# Patient Record
Sex: MALE | Race: WHITE | NOT HISPANIC OR LATINO | Employment: FULL TIME | URBAN - METROPOLITAN AREA
[De-identification: names, ages, dates, MRNs, and addresses within clinical notes are randomized per-mention and may not be internally consistent; named-entity substitution may affect disease eponyms.]

---

## 2020-02-26 ENCOUNTER — HOSPITAL ENCOUNTER (OUTPATIENT)
Dept: RADIOLOGY | Facility: MEDICAL CENTER | Age: 30
End: 2020-02-26
Attending: PHYSICIAN ASSISTANT

## 2020-02-26 ENCOUNTER — OFFICE VISIT (OUTPATIENT)
Dept: URGENT CARE | Facility: PHYSICIAN GROUP | Age: 30
End: 2020-02-26

## 2020-02-26 VITALS
HEART RATE: 116 BPM | BODY MASS INDEX: 17.03 KG/M2 | WEIGHT: 137 LBS | DIASTOLIC BLOOD PRESSURE: 78 MMHG | TEMPERATURE: 101.3 F | OXYGEN SATURATION: 98 % | SYSTOLIC BLOOD PRESSURE: 124 MMHG | RESPIRATION RATE: 18 BRPM | HEIGHT: 75 IN

## 2020-02-26 DIAGNOSIS — J18.9 PNEUMONIA OF LEFT LOWER LOBE DUE TO INFECTIOUS ORGANISM: ICD-10-CM

## 2020-02-26 DIAGNOSIS — R05.9 COUGH: ICD-10-CM

## 2020-02-26 LAB
FLUAV+FLUBV AG SPEC QL IA: NEGATIVE
INT CON NEG: NEGATIVE
INT CON NEG: NORMAL
INT CON POS: NORMAL
INT CON POS: POSITIVE
S PYO AG THROAT QL: NEGATIVE

## 2020-02-26 PROCEDURE — 87880 STREP A ASSAY W/OPTIC: CPT | Performed by: PHYSICIAN ASSISTANT

## 2020-02-26 PROCEDURE — 87804 INFLUENZA ASSAY W/OPTIC: CPT | Performed by: PHYSICIAN ASSISTANT

## 2020-02-26 PROCEDURE — 71046 X-RAY EXAM CHEST 2 VIEWS: CPT

## 2020-02-26 PROCEDURE — 99204 OFFICE O/P NEW MOD 45 MIN: CPT | Performed by: PHYSICIAN ASSISTANT

## 2020-02-26 RX ORDER — AMOXICILLIN AND CLAVULANATE POTASSIUM 875; 125 MG/1; MG/1
1 TABLET, FILM COATED ORAL 2 TIMES DAILY
Qty: 14 TAB | Refills: 0 | Status: SHIPPED | OUTPATIENT
Start: 2020-02-26 | End: 2020-03-04

## 2020-02-26 RX ORDER — DOXYCYCLINE HYCLATE 100 MG
100 TABLET ORAL 2 TIMES DAILY
Qty: 14 TAB | Refills: 0 | Status: SHIPPED | OUTPATIENT
Start: 2020-02-26 | End: 2020-03-04

## 2020-02-26 RX ORDER — IBUPROFEN 200 MG
200 TABLET ORAL EVERY 6 HOURS PRN
COMMUNITY

## 2020-02-26 RX ORDER — IBUPROFEN 200 MG
600 TABLET ORAL ONCE
Status: COMPLETED | OUTPATIENT
Start: 2020-02-26 | End: 2020-02-26

## 2020-02-26 RX ADMIN — Medication 600 MG: at 17:11

## 2020-02-26 NOTE — LETTER
February 26, 2020         Patient: Cabrera Branch   YOB: 1990   Date of Visit: 2/26/2020           To Whom it May Concern:    Cabrera Branch was seen in my clinic on 2/26/2020. Please excuse his absence from 2/27/20-2/29/20.    If you have any questions or concerns, please don't hesitate to call.        Sincerely,           Missy Dalal P.A.-C.  Electronically Signed

## 2020-02-28 ENCOUNTER — HOSPITAL ENCOUNTER (EMERGENCY)
Facility: MEDICAL CENTER | Age: 30
End: 2020-02-28
Attending: EMERGENCY MEDICINE

## 2020-02-28 ENCOUNTER — APPOINTMENT (OUTPATIENT)
Dept: RADIOLOGY | Facility: MEDICAL CENTER | Age: 30
End: 2020-02-28
Attending: EMERGENCY MEDICINE

## 2020-02-28 VITALS
HEIGHT: 75 IN | DIASTOLIC BLOOD PRESSURE: 80 MMHG | SYSTOLIC BLOOD PRESSURE: 115 MMHG | BODY MASS INDEX: 17.16 KG/M2 | OXYGEN SATURATION: 97 % | RESPIRATION RATE: 23 BRPM | TEMPERATURE: 99.9 F | WEIGHT: 138.01 LBS | HEART RATE: 84 BPM

## 2020-02-28 DIAGNOSIS — J18.9 PNEUMONIA OF RIGHT LUNG DUE TO INFECTIOUS ORGANISM, UNSPECIFIED PART OF LUNG: ICD-10-CM

## 2020-02-28 DIAGNOSIS — J18.9 PNEUMONIA OF BOTH LOWER LOBES DUE TO INFECTIOUS ORGANISM: ICD-10-CM

## 2020-02-28 LAB
ALBUMIN SERPL BCP-MCNC: 4.2 G/DL (ref 3.2–4.9)
ALBUMIN/GLOB SERPL: 1.1 G/DL
ALP SERPL-CCNC: 95 U/L (ref 30–99)
ALT SERPL-CCNC: 76 U/L (ref 2–50)
ANION GAP SERPL CALC-SCNC: 10 MMOL/L (ref 0–11.9)
AST SERPL-CCNC: 97 U/L (ref 12–45)
BASOPHILS # BLD AUTO: 0.5 % (ref 0–1.8)
BASOPHILS # BLD: 0.02 K/UL (ref 0–0.12)
BILIRUB SERPL-MCNC: 0.8 MG/DL (ref 0.1–1.5)
BUN SERPL-MCNC: 18 MG/DL (ref 8–22)
CALCIUM SERPL-MCNC: 9.3 MG/DL (ref 8.5–10.5)
CHLORIDE SERPL-SCNC: 100 MMOL/L (ref 96–112)
CO2 SERPL-SCNC: 26 MMOL/L (ref 20–33)
CREAT SERPL-MCNC: 0.79 MG/DL (ref 0.5–1.4)
EOSINOPHIL # BLD AUTO: 0.04 K/UL (ref 0–0.51)
EOSINOPHIL NFR BLD: 1 % (ref 0–6.9)
ERYTHROCYTE [DISTWIDTH] IN BLOOD BY AUTOMATED COUNT: 42.2 FL (ref 35.9–50)
FLUAV RNA SPEC QL NAA+PROBE: NEGATIVE
FLUBV RNA SPEC QL NAA+PROBE: NEGATIVE
GLOBULIN SER CALC-MCNC: 3.7 G/DL (ref 1.9–3.5)
GLUCOSE SERPL-MCNC: 100 MG/DL (ref 65–99)
HCT VFR BLD AUTO: 45.6 % (ref 42–52)
HGB BLD-MCNC: 15.9 G/DL (ref 14–18)
IMM GRANULOCYTES # BLD AUTO: 0.01 K/UL (ref 0–0.11)
IMM GRANULOCYTES NFR BLD AUTO: 0.3 % (ref 0–0.9)
LACTATE BLD-SCNC: 1.4 MMOL/L (ref 0.5–2)
LYMPHOCYTES # BLD AUTO: 1.18 K/UL (ref 1–4.8)
LYMPHOCYTES NFR BLD: 29.8 % (ref 22–41)
MCH RBC QN AUTO: 31.7 PG (ref 27–33)
MCHC RBC AUTO-ENTMCNC: 34.9 G/DL (ref 33.7–35.3)
MCV RBC AUTO: 91 FL (ref 81.4–97.8)
MONOCYTES # BLD AUTO: 0.35 K/UL (ref 0–0.85)
MONOCYTES NFR BLD AUTO: 8.8 % (ref 0–13.4)
NEUTROPHILS # BLD AUTO: 2.36 K/UL (ref 1.82–7.42)
NEUTROPHILS NFR BLD: 59.6 % (ref 44–72)
NRBC # BLD AUTO: 0 K/UL
NRBC BLD-RTO: 0 /100 WBC
PLATELET # BLD AUTO: 199 K/UL (ref 164–446)
PMV BLD AUTO: 10.6 FL (ref 9–12.9)
POTASSIUM SERPL-SCNC: 3.8 MMOL/L (ref 3.6–5.5)
PROT SERPL-MCNC: 7.9 G/DL (ref 6–8.2)
RBC # BLD AUTO: 5.01 M/UL (ref 4.7–6.1)
SODIUM SERPL-SCNC: 136 MMOL/L (ref 135–145)
WBC # BLD AUTO: 4 K/UL (ref 4.8–10.8)

## 2020-02-28 PROCEDURE — 96365 THER/PROPH/DIAG IV INF INIT: CPT

## 2020-02-28 PROCEDURE — 700117 HCHG RX CONTRAST REV CODE 255: Performed by: EMERGENCY MEDICINE

## 2020-02-28 PROCEDURE — 71046 X-RAY EXAM CHEST 2 VIEWS: CPT

## 2020-02-28 PROCEDURE — 94640 AIRWAY INHALATION TREATMENT: CPT

## 2020-02-28 PROCEDURE — 36415 COLL VENOUS BLD VENIPUNCTURE: CPT

## 2020-02-28 PROCEDURE — 80053 COMPREHEN METABOLIC PANEL: CPT

## 2020-02-28 PROCEDURE — 700111 HCHG RX REV CODE 636 W/ 250 OVERRIDE (IP): Performed by: EMERGENCY MEDICINE

## 2020-02-28 PROCEDURE — 96375 TX/PRO/DX INJ NEW DRUG ADDON: CPT

## 2020-02-28 PROCEDURE — 700101 HCHG RX REV CODE 250: Performed by: EMERGENCY MEDICINE

## 2020-02-28 PROCEDURE — 71275 CT ANGIOGRAPHY CHEST: CPT

## 2020-02-28 PROCEDURE — 99285 EMERGENCY DEPT VISIT HI MDM: CPT

## 2020-02-28 PROCEDURE — 87502 INFLUENZA DNA AMP PROBE: CPT

## 2020-02-28 PROCEDURE — 83605 ASSAY OF LACTIC ACID: CPT

## 2020-02-28 PROCEDURE — 85025 COMPLETE CBC W/AUTO DIFF WBC: CPT

## 2020-02-28 RX ORDER — ALBUTEROL SULFATE 90 UG/1
2 AEROSOL, METERED RESPIRATORY (INHALATION) EVERY 6 HOURS PRN
Qty: 8.5 G | Refills: 0 | Status: SHIPPED | OUTPATIENT
Start: 2020-02-28

## 2020-02-28 RX ORDER — METHYLPREDNISOLONE SODIUM SUCCINATE 125 MG/2ML
125 INJECTION, POWDER, LYOPHILIZED, FOR SOLUTION INTRAMUSCULAR; INTRAVENOUS ONCE
Status: COMPLETED | OUTPATIENT
Start: 2020-02-28 | End: 2020-02-28

## 2020-02-28 RX ORDER — AZITHROMYCIN 250 MG/1
TABLET, FILM COATED ORAL
Qty: 6 TAB | Refills: 0 | Status: SHIPPED | OUTPATIENT
Start: 2020-02-28

## 2020-02-28 RX ORDER — AZITHROMYCIN 500 MG/1
500 INJECTION, POWDER, LYOPHILIZED, FOR SOLUTION INTRAVENOUS ONCE
Status: COMPLETED | OUTPATIENT
Start: 2020-02-28 | End: 2020-02-28

## 2020-02-28 RX ORDER — IPRATROPIUM BROMIDE AND ALBUTEROL SULFATE 2.5; .5 MG/3ML; MG/3ML
3 SOLUTION RESPIRATORY (INHALATION)
Status: COMPLETED | OUTPATIENT
Start: 2020-02-28 | End: 2020-02-28

## 2020-02-28 RX ADMIN — IOHEXOL 100 ML: 350 INJECTION, SOLUTION INTRAVENOUS at 12:25

## 2020-02-28 RX ADMIN — AZITHROMYCIN MONOHYDRATE 500 MG: 500 INJECTION, POWDER, LYOPHILIZED, FOR SOLUTION INTRAVENOUS at 13:38

## 2020-02-28 RX ADMIN — METHYLPREDNISOLONE SODIUM SUCCINATE 125 MG: 125 INJECTION, POWDER, FOR SOLUTION INTRAMUSCULAR; INTRAVENOUS at 10:30

## 2020-02-28 RX ADMIN — IPRATROPIUM BROMIDE AND ALBUTEROL SULFATE 3 ML: .5; 3 SOLUTION RESPIRATORY (INHALATION) at 10:14

## 2020-02-28 ASSESSMENT — ENCOUNTER SYMPTOMS
NAUSEA: 0
COUGH: 1
WHEEZING: 0
EYE REDNESS: 0
SORE THROAT: 0
EYE DISCHARGE: 0
MYALGIAS: 1
HEMOPTYSIS: 0
FEVER: 1
DIARRHEA: 0
DIZZINESS: 0
SHORTNESS OF BREATH: 0
SPUTUM PRODUCTION: 0
CHILLS: 1
ABDOMINAL PAIN: 0
VOMITING: 0

## 2020-02-28 ASSESSMENT — LIFESTYLE VARIABLES
DO YOU DRINK ALCOHOL: YES
HAVE PEOPLE ANNOYED YOU BY CRITICIZING YOUR DRINKING: NO
TOTAL SCORE: 0
HAVE YOU EVER FELT YOU SHOULD CUT DOWN ON YOUR DRINKING: NO
EVER FELT BAD OR GUILTY ABOUT YOUR DRINKING: NO
TOTAL SCORE: 0
TOTAL SCORE: 0
CONSUMPTION TOTAL: INCOMPLETE
EVER HAD A DRINK FIRST THING IN THE MORNING TO STEADY YOUR NERVES TO GET RID OF A HANGOVER: NO

## 2020-02-28 NOTE — ED PROVIDER NOTES
"ED Provider Note    Scribed for Otilio Chicas D.O. by Ivan Merchant. 2/28/2020  9:31 AM    Primary care provider: Pcp Pt States None  Means of arrival: walk in  History obtained from: patient  History limited by: none    CHIEF COMPLAINT  Chief Complaint   Patient presents with   • Cough     seen at [facility] a few days ago and xray completed - \"found something in my lung\"; cough since last wednesday, productive yellow sputum, pt reports fevers 105 at home.       HPI  Cabrera Branch is a 30 y.o. male who presents to the Emergency Department complaining of persistent cough for the last 9 days. Patient reports associated intermittent fever, max 105 °F, body aches, yellow sputum production. He states that his symptoms came on 9 days ago and have been gradually worsening. Patient was previously evaluated with radiology at urgent care and found to have abnormal lung findings, so he presents to the ED for evaluation and rule out of pneumonia. He reports a history of distant history of bronchitis. Patient denies shortness of breath, cigarette use, marijuana use.     REVIEW OF SYSTEMS  Pertinent positives include cough, fever, body aches, sputum production. Pertinent negatives include no shortness of breath.  All other systems reviewed and negative.    PAST MEDICAL HISTORY  History reviewed. No pertinent past medical history.    SURGICAL HISTORY  History reviewed. No pertinent surgical history.     SOCIAL HISTORY  Social History     Tobacco Use   • Smoking status: Never Smoker   • Smokeless tobacco: Never Used   Substance Use Topics   • Alcohol use: Not Currently   • Drug use: Not Currently      Social History     Substance and Sexual Activity   Drug Use Not Currently       FAMILY HISTORY  History reviewed. No pertinent family history.    CURRENT MEDICATIONS  Home Medications     Reviewed by Jasmin Alvarez R.N. (Registered Nurse) on 02/28/20 at 0859  Med List Status: Partial   Medication Last Dose Status " "  amoxicillin-clavulanate (AUGMENTIN) 875-125 MG Tab 2/27/2020 Active   doxycycline (VIBRAMYCIN) 100 MG Tab 2/27/2020 Active   ibuprofen (MOTRIN) 200 MG Tab 2/28/2020 Active                ALLERGIES  No Known Allergies    PHYSICAL EXAM  VITAL SIGNS: /80   Pulse (!) 108   Temp 37.7 °C (99.9 °F) (Oral)   Resp 20   Ht 1.905 m (6' 3\")   Wt 62.6 kg (138 lb 0.1 oz)   SpO2 96%   BMI 17.25 kg/m²     Nursing notes and vitals reviewed.  Constitutional: Well developed, Well nourished, No acute distress, Non-toxic appearance.   Eyes: PERRLA, EOMI, Conjunctiva normal, No discharge.   Cardiovascular: Normal heart rate, Normal rhythm, No murmurs, No rubs, No gallops.   Thorax & Lungs: No respiratory distress, No wheezing, No chest tenderness. Rales and rhonchi bilaterally.   Abdomen: Bowel sounds normal, Soft, No tenderness, No guarding, No rebound, No masses, No pulsatile masses.   Skin: Warm, Dry, No erythema, No rash.   Musculoskeletal: Intact distal pulses, No edema, No cyanosis, No clubbing. Good range of motion in all major joints. No tenderness to palpation or major deformities noted, no CVA tenderness, no midline back tenderness.   Neurologic: Alert & oriented x 3, Normal motor function, Normal sensory function, No focal deficits noted.  Psychiatric: Affect normal for clinical presentation.    DIAGNOSTIC STUDIES/PROCEDURES    LABS  Results for orders placed or performed during the hospital encounter of 02/28/20   Lactic acid (lactate)   Result Value Ref Range    Lactic Acid 1.4 0.5 - 2.0 mmol/L   CBC WITH DIFFERENTIAL   Result Value Ref Range    WBC 4.0 (L) 4.8 - 10.8 K/uL    RBC 5.01 4.70 - 6.10 M/uL    Hemoglobin 15.9 14.0 - 18.0 g/dL    Hematocrit 45.6 42.0 - 52.0 %    MCV 91.0 81.4 - 97.8 fL    MCH 31.7 27.0 - 33.0 pg    MCHC 34.9 33.7 - 35.3 g/dL    RDW 42.2 35.9 - 50.0 fL    Platelet Count 199 164 - 446 K/uL    MPV 10.6 9.0 - 12.9 fL    Neutrophils-Polys 59.60 44.00 - 72.00 %    Lymphocytes 29.80 22.00 - " 41.00 %    Monocytes 8.80 0.00 - 13.40 %    Eosinophils 1.00 0.00 - 6.90 %    Basophils 0.50 0.00 - 1.80 %    Immature Granulocytes 0.30 0.00 - 0.90 %    Nucleated RBC 0.00 /100 WBC    Neutrophils (Absolute) 2.36 1.82 - 7.42 K/uL    Lymphs (Absolute) 1.18 1.00 - 4.80 K/uL    Monos (Absolute) 0.35 0.00 - 0.85 K/uL    Eos (Absolute) 0.04 0.00 - 0.51 K/uL    Baso (Absolute) 0.02 0.00 - 0.12 K/uL    Immature Granulocytes (abs) 0.01 0.00 - 0.11 K/uL    NRBC (Absolute) 0.00 K/uL   COMP METABOLIC PANEL   Result Value Ref Range    Sodium 136 135 - 145 mmol/L    Potassium 3.8 3.6 - 5.5 mmol/L    Chloride 100 96 - 112 mmol/L    Co2 26 20 - 33 mmol/L    Anion Gap 10.0 0.0 - 11.9    Glucose 100 (H) 65 - 99 mg/dL    Bun 18 8 - 22 mg/dL    Creatinine 0.79 0.50 - 1.40 mg/dL    Calcium 9.3 8.5 - 10.5 mg/dL    AST(SGOT) 97 (H) 12 - 45 U/L    ALT(SGPT) 76 (H) 2 - 50 U/L    Alkaline Phosphatase 95 30 - 99 U/L    Total Bilirubin 0.8 0.1 - 1.5 mg/dL    Albumin 4.2 3.2 - 4.9 g/dL    Total Protein 7.9 6.0 - 8.2 g/dL    Globulin 3.7 (H) 1.9 - 3.5 g/dL    A-G Ratio 1.1 g/dL   Influenza A/B By PCR (Adult - Flu Only)   Result Value Ref Range    Influenza virus A RNA Negative Negative    Influenza virus B, PCR Negative Negative   ESTIMATED GFR   Result Value Ref Range    GFR If African American >60 >60 mL/min/1.73 m 2    GFR If Non African American >60 >60 mL/min/1.73 m 2   All labs reviewed by me.    RADIOLOGY  CT-CTA CHEST PULMONARY ARTERY W/ RECONS   Final Result      No central or segmental pulmonary unless is identified.      Bilateral airspace opacities likely representing multifocal pneumonia.                  DX-CHEST-2 VIEWS   Final Result      No acute cardiopulmonary abnormality identified.      The radiologist's interpretation of all radiological studies have been reviewed by me.    COURSE & MEDICAL DECISION MAKING  Pertinent Labs & Imaging studies reviewed. (See chart for details)    9:31 AM - Patient seen and examined at bedside.  "Base don his exam, I discussed rule out of pneumonia, influenza in the ED. Patient verbalizes understanding and agreement to this plan of care. Patient will be treated with Duoneb. Ordered DX chest, Lactic acid x2, CBC with differential, CMP, Urinalysis, Urine culture, Blood culture x2 to evaluate his symptoms.     10:02 AM - Patient reevaluated at bedside. Discussed negative radiology and obtaining labs to evaluate. Patient verbalizes agreement. Ordered for CT CTA chest pulmonary artery.     1:13 PM - Recheck: Patient re-evaluated at beside. Patient's diagnostic results discussed. Discussed patient's condition and treatment plan. Patient will be discharged with instructions and provided with strict return precautions. Patient will be sent home with a prescription for albuterol, Zithromax. Advised to follow up with his primary. Instructed to return to Emergency Department immediately if any new or worsening symptoms.    Discharge vitals: /80   Pulse 74   Temp 37.7 °C (99.9 °F) (Oral)   Resp (!) 21   Ht 1.905 m (6' 3\")   Wt 62.6 kg (138 lb 0.1 oz)   SpO2 94%   BMI 17.25 kg/m²     This is a charming 30 y.o. male that presents with pneumonia.  Initial x-rays negative I was concerned for possible occult pneumonia.  For this reason CT scan was completed and CT revealed multifocal pneumonia.  The patient subsequently had a heart rate of 74 bpm, normal blood pressure, saturations 94% to 96% on room air, and albuterol Atrovent nebulized solution with significant improvement.  Notably the patient requires hospitalization.  I did add azithromycin to his Augmentin and albuterol inhaler.  Of asked return to the emergency department increasing symptomatology.  I do believe the patient is okay for an outpatient evaluation and does agree but he understands to return to the emergency department he has increasing symptoms, severe shortness of breath or concerns.    The patient will return for new or worsening symptoms " and is stable at the time of discharge.    The patient is referred to a primary physician for blood pressure management, diabetic screening, and for all other preventative health concerns.    DISPOSITION:  Patient will be discharged home in stable condition.    FOLLOW UP:  [facility], Emergency Dept  [address] [address] [phone]    If symptoms worsen    [address] [address]  [phone]  Schedule an appointment as soon as possible for a visit         OUTPATIENT MEDICATIONS:  New Prescriptions    ALBUTEROL 108 (90 BASE) MCG/ACT AERO SOLN INHALATION AEROSOL    Inhale 2 Puffs by mouth every 6 hours as needed for Shortness of Breath.    AZITHROMYCIN (ZITHROMAX) 250 MG TAB    Take two tabs by mouth on day one, then one tab by mouth daily on days 2-5.       FINAL IMPRESSION  1. Pneumonia of both lower lobes due to infectious organism (HCC) Active   2. Pneumonia of right lung due to infectious organism, unspecified part of lung Active         IIvan (Morenaibe), am scribing for, and in the presence of, Otilio Chicas D.O    Electronically signed by: Ivan Merchant (Scribe), 2/28/2020    IOtilio D.O. personally performed the services described in this documentation, as scribed by Ivan Merchant in my presence, and it is both accurate and complete. C    The note accurately reflects work and decisions made by me.  Otilio Chicas D.O.  2/28/2020  2:34 PM

## 2020-02-28 NOTE — ED NOTES
Blood drawn from initial IV start and labeled after verification of patient name and date of birth. Sent to Lab

## 2020-02-28 NOTE — PROGRESS NOTES
"Subjective:      Cabrera Branch is a 30 y.o. male who presents with Cough (congestion, fever, sorethroat, pt states his fever has fluctuated in the last several days x  8 days)        Patient is accompanied by his roommates.     Cough   This is a new problem. The current episode started in the past 7 days (6 days). The problem has been unchanged. The cough is non-productive. Associated symptoms include chills, a fever and myalgias. Pertinent negatives include no chest pain, ear pain, eye redness, hemoptysis, rash, sore throat, shortness of breath or wheezing. Nothing aggravates the symptoms. He has tried nothing for the symptoms.     Patient presents to urgent care reporting a 6 day history of cough, congestions, intermittent fevers, chills, body aches, and sore throat. Highest measured fever of 103 F. He reports feeling \"dehydrated\" and has had two episodes where he fainted after becoming dizzy while standing. His roommate witnessed the incident and states LOC lasted for a few seconds. He denies any head trauma or injuries sustained during these incidents. He has no known medical problems and doesn't take any regular medications. No recent travel or sick contacts. No history of smoking. No history of pneumonia. No recent use of antibiotics.     Review of Systems   Constitutional: Positive for chills and fever.   HENT: Positive for congestion. Negative for ear pain and sore throat.    Eyes: Negative for discharge and redness.   Respiratory: Positive for cough. Negative for hemoptysis, sputum production, shortness of breath and wheezing.    Cardiovascular: Negative for chest pain.   Gastrointestinal: Negative for abdominal pain, diarrhea, nausea and vomiting.   Genitourinary: Negative.    Musculoskeletal: Positive for myalgias.   Skin: Negative for rash.   Neurological: Negative for dizziness.        Objective:     /78 (BP Location: Left arm, Patient Position: Sitting, BP Cuff Size: Adult)   Pulse (!) " "116   Temp (!) 38.5 °C (101.3 °F) (Temporal)   Resp 18   Ht 1.905 m (6' 3\")   Wt 62.1 kg (137 lb)   SpO2 98%   BMI 17.12 kg/m²        Physical Exam  Vitals signs and nursing note reviewed.   Constitutional:       General: He is not in acute distress.     Appearance: Normal appearance. He is well-developed. He is ill-appearing and diaphoretic.   HENT:      Head: Normocephalic.      Right Ear: Hearing, tympanic membrane, ear canal and external ear normal.      Left Ear: Hearing, tympanic membrane, ear canal and external ear normal.      Nose: Congestion and rhinorrhea present.      Mouth/Throat:      Mouth: Mucous membranes are moist.      Pharynx: No oropharyngeal exudate or posterior oropharyngeal erythema.   Eyes:      General:         Right eye: No discharge.         Left eye: No discharge.      Pupils: Pupils are equal, round, and reactive to light.   Neck:      Musculoskeletal: Normal range of motion.   Cardiovascular:      Rate and Rhythm: Regular rhythm. Tachycardia present.      Heart sounds: Normal heart sounds. No murmur.   Pulmonary:      Effort: Pulmonary effort is normal.      Breath sounds: Normal breath sounds. No wheezing or rales.      Comments: Dry cough present throughout exam  Musculoskeletal: Normal range of motion.      Right lower leg: No edema.      Left lower leg: No edema.   Lymphadenopathy:      Cervical: No cervical adenopathy.   Skin:     General: Skin is warm.   Neurological:      Mental Status: He is alert.          PMH:  has no past medical history on file.  MEDS:   Current Outpatient Medications:   •  ibuprofen (MOTRIN) 200 MG Tab, Take 200 mg by mouth every 6 hours as needed., Disp: , Rfl:   •  doxycycline (VIBRAMYCIN) 100 MG Tab, Take 1 Tab by mouth 2 times a day for 7 days., Disp: 14 Tab, Rfl: 0  •  amoxicillin-clavulanate (AUGMENTIN) 875-125 MG Tab, Take 1 Tab by mouth 2 times a day for 7 days., Disp: 14 Tab, Rfl: 0  ALLERGIES: No Known Allergies  SURGHX: History reviewed. " No pertinent surgical history.  SOCHX:  reports that he has never smoked. He has never used smokeless tobacco. He reports previous alcohol use. He reports previous drug use.  FH: family history is not on file.       Assessment/Plan:       1. Pneumonia of left lower lobe due to infectious organism (HCC)  - POCT Rapid Strep A: NEGATIVE   - POCT Influenza A/B: NEGATIVE   - ibuprofen (MOTRIN) tablet 600 mg  - DX-CHEST-2 VIEWS; Future  IMPRESSION:     1.  Poorly defined perihilar opacifications are present mainly in the left lung. This raises the possibility of bronchitis or pneumonitis or atypical pneumonia.       - doxycycline (VIBRAMYCIN) 100 MG Tab; Take 1 Tab by mouth 2 times a day for 7 days.  Dispense: 14 Tab; Refill: 0  - amoxicillin-clavulanate (AUGMENTIN) 875-125 MG Tab; Take 1 Tab by mouth 2 times a day for 7 days.  Dispense: 14 Tab; Refill: 0   - Complete full course of antibiotics as prescribed     Possible atypical pneumonia seen on CXR at today's visit. Advised patient he should go to the ED for further evaluation of syncopal episodes, although he is uninsured and declines at this time. Blood pressure is normal at today's visit. Oxygen saturation at 98% on RA. He denies any significant dizziness or lightheadedness at this time. Will treat with doxycycline and augmentin. Encouraged increased fluids and rest. Alternate between OTC tylenol and ibuprofen every 4 hours as needed for fever control. STRICT ED precautions given for any persistent/worsening symptoms. The patient and his roommates demonstrated a good understanding and agreed with the treatment plan.

## 2020-02-28 NOTE — ED TRIAGE NOTES
"Pt ambulated to triage with   Chief Complaint   Patient presents with   • Cough     seen at [facility] a few days ago and xray completed - \"found something in my lung\"; cough since last wednesday, productive yellow sputum, pt reports fevers 105 at home.     Pt started on abx at , sepsis score 3, charge RN informed of pt.   Pt Informed regarding triage process and verbalized understanding to inform triage tech or RN for any changes in condition. Placed in lobby.       "

## 2020-02-28 NOTE — ED NOTES
Discharge, Follow Up, and Rx education provided to patient who verbalizes understanding   Patient denies further questions at this time   Pt was given prescriptions   Wheelchair was offered to the waiting room and refused by patient  Patient ambulated to the ED Lobby with steady gait

## 2020-02-28 NOTE — ED NOTES
No IV or blood work at this time per ERP     Only to complete Chest XR, duoneb and nasal swab at this time.

## 2020-02-28 NOTE — LETTER
Emergency Services     February 28, 2020    Patient: Cabrera Branch   YOB: 1990   Date of Visit: 2/28/2020       To Whom It May Concern:    Cabrera Branch was seen and treated in our emergency department on 2/28/2020. He may return to work on 3/4/2020.    Sincerely,     CYNDI ONEIL D.O.  [facility], EMERGENCY DEPT  Dept: [phone]

## 2020-02-28 NOTE — ED NOTES
Still awaiting CT    Hourly rounding completed  Patient is up to date on current plan of care  Patient is resting comfortably in bed   Patient denies any need for use of the restroom, denies need for repositioning, denies need for any comfort care at this time

## 2020-02-28 NOTE — ED NOTES
Hourly rounding completed  Patient is up to date on current plan of care  Patient is resting comfortably in bed   Patient denies any need for use of the restroom, denies need for repositioning, denies need for any comfort care at this time     Awaiting Azithromycin then D/C

## 2020-07-24 ENCOUNTER — OFFICE VISIT (OUTPATIENT)
Dept: URGENT CARE | Facility: PHYSICIAN GROUP | Age: 30
End: 2020-07-24
Payer: COMMERCIAL

## 2020-07-24 VITALS
HEIGHT: 75 IN | TEMPERATURE: 99.3 F | HEART RATE: 65 BPM | RESPIRATION RATE: 18 BRPM | OXYGEN SATURATION: 100 % | BODY MASS INDEX: 18.03 KG/M2 | WEIGHT: 145 LBS

## 2020-07-24 DIAGNOSIS — L55.9 SUNBURN: ICD-10-CM

## 2020-07-24 PROCEDURE — 99214 OFFICE O/P EST MOD 30 MIN: CPT | Performed by: PHYSICIAN ASSISTANT

## 2020-07-24 RX ORDER — PREDNISONE 20 MG/1
TABLET ORAL
Qty: 15 TAB | Refills: 0 | Status: SHIPPED | OUTPATIENT
Start: 2020-07-24

## 2020-07-24 ASSESSMENT — ENCOUNTER SYMPTOMS
SHORTNESS OF BREATH: 0
COUGH: 0
PALPITATIONS: 0
ROS SKIN COMMENTS: SUN BURN
FEVER: 0
SORE THROAT: 0
MYALGIAS: 0

## 2020-07-24 ASSESSMENT — FIBROSIS 4 INDEX: FIB4 SCORE: 1.79

## 2020-07-24 NOTE — PROGRESS NOTES
"Subjective:   Cabrera Branch is a 30 y.o. male who presents for Sunburn (L foot swollen, L eye swollen, burn all over, x 3 days)      Sunburn   This is a new problem. The current episode started in the past 7 days. The problem occurs constantly. The problem has been unchanged. Pertinent negatives include no chest pain, coughing, fever, myalgias or sore throat. Nothing aggravates the symptoms. He has tried NSAIDs for the symptoms. The treatment provided no relief.       Review of Systems   Constitutional: Negative for fever and malaise/fatigue.   HENT: Negative for sore throat.    Respiratory: Negative for cough and shortness of breath.    Cardiovascular: Negative for chest pain and palpitations.   Musculoskeletal: Negative for joint pain and myalgias.   Skin:        Sun burn   All other systems reviewed and are negative.      Medications:    • albuterol Aers  • azithromycin Tabs  • ibuprofen Tabs  • predniSONE Tabs    Allergies: Patient has no known allergies.    Problem List: Cabrera Branch does not have a problem list on file.    Surgical History:  No past surgical history on file.    Past Social Hx: Cabrera Bracnh  reports that he has never smoked. He has never used smokeless tobacco. He reports previous alcohol use. He reports previous drug use.     Past Family Hx:  Cabrera Branch family history is not on file.     Problem list, medications, and allergies reviewed by myself today in Epic.     Objective:     Pulse 65   Temperature 37.4 °C (99.3 °F) (Temporal)   Respiration 18   Height 1.905 m (6' 3\")   Weight 65.8 kg (145 lb)   Oxygen Saturation 100%   Body Mass Index 18.12 kg/m²     Physical Exam  Vitals signs reviewed.   Constitutional:       Appearance: He is well-developed.   Cardiovascular:      Rate and Rhythm: Normal rate and regular rhythm.      Heart sounds: Normal heart sounds.   Pulmonary:      Effort: Pulmonary effort is normal.      Breath sounds: Normal breath " sounds.   Musculoskeletal: Normal range of motion.   Skin:     General: Skin is warm and dry.      Findings: Erythema present.      Comments: Superficial burn of face, torso, arms and legs   Neurological:      Mental Status: He is alert and oriented to person, place, and time.   Psychiatric:         Behavior: Behavior normal.         Thought Content: Thought content normal.         Judgment: Judgment normal.         Assessment/Plan:     Medical Decision Making/Comments        Diagnosis and associated orders     1. Sunburn  predniSONE (DELTASONE) 20 MG Tab              Differential diagnosis, natural history, supportive care, and indications for immediate follow-up discussed.    Advised the patient to follow-up with the primary care physician for recheck, reevaluation, and consideration of further management.    Please note that this dictation was created using voice recognition software. I have made a reasonable attempt to correct obvious errors, but I expect that there are errors of grammar and possibly content that I did not discover before finalizing the note.

## 2020-07-24 NOTE — LETTER
July 24, 2020         Patient: Cabrera Branch   YOB: 1990   Date of Visit: 7/24/2020           To Whom it May Concern:    Cabrera Branch was seen in my clinic on 7/24/2020. Please excuse him from work 7/24-7/25/2020.  If you have any questions or concerns, please don't hesitate to call.        Sincerely,           Patricio Hilliard P.A.-C.  Electronically Signed

## 2022-02-07 NOTE — ED NOTES
Hourly rounding completed  Patient is up to date on current plan of care  Patient is resting comfortably in bed   Patient denies any need for use of the restroom, denies need for repositioning, denies need for any comfort care at this time        Spoke with patient. States he is \"status quo\" at home. Still on 4L O2. Denies current fever, body aches or chills. He has baseline shortness of breath. Occasional cough. Does have sinus congestion with yellow drainage. Inquires if he should continue with prophylactic abx and would like Dr. Ramila Daigle opinion on navage nasal irrigation.